# Patient Record
Sex: MALE | URBAN - METROPOLITAN AREA
[De-identification: names, ages, dates, MRNs, and addresses within clinical notes are randomized per-mention and may not be internally consistent; named-entity substitution may affect disease eponyms.]

---

## 2023-05-05 ENCOUNTER — OFFICE VISIT (OUTPATIENT)
Dept: URBAN - METROPOLITAN AREA CLINIC 30 | Facility: CLINIC | Age: 28
End: 2023-05-05
Payer: COMMERCIAL

## 2023-05-05 DIAGNOSIS — E11.9 TYPE 2 DIABETES MELLITUS WITHOUT COMPLICATIONS: ICD-10-CM

## 2023-05-05 DIAGNOSIS — D35.2 BENIGN TUMOR OF PITUITARY GLAND: Primary | ICD-10-CM

## 2023-05-05 PROCEDURE — 92004 COMPRE OPH EXAM NEW PT 1/>: CPT

## 2023-05-05 PROCEDURE — 92133 CPTRZD OPH DX IMG PST SGM ON: CPT

## 2023-05-05 ASSESSMENT — KERATOMETRY
OD: 40.99
OS: 41.64

## 2023-05-05 ASSESSMENT — VISUAL ACUITY
OS: CF 1FT
OD: 20/500

## 2023-05-05 ASSESSMENT — INTRAOCULAR PRESSURE
OS: 20
OD: 20

## 2023-05-05 NOTE — IMPRESSION/PLAN
Impression: Type 2 diabetes mellitus without complications: W40.5. A1c 9% Plan: No diabetic retinopathy is noted. Patient is advised that a yearly ophthalmic exam is recommended. Return sooner if any new symptoms.

## 2023-05-05 NOTE — IMPRESSION/PLAN
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 Has been treated with meds, tumor now reduced in size. Under care of Dr. Shell Nolasco at [facility]. HISTORY: Pt noted poor peripheral vision OS followed by OD around 2016, did not mention to parents or a PCP. In 2020 saw Dr. Mi Germain and diagnosed bitemporal HVF loss and  [place], diagnosed with pituitary tumor. Was started on bromocriptine but had panic attacks, tx with steroids, then given cabergoline. Also treated with [facility] for a few months. MRI 2023: Significant decrease in the prolactinoma. Increased downward herniation of the inferior frontal lobes, the anterior cerebral arteries, as well as likely the chiasm into the sellar region. Similar small, likely atrophic optic nerves. The optic chiasm is not well seen. Today 2023:
BCVA: OD 20/500, OS CF
+APD OS 
OCT RNFL: diffuse loss 55um OD, diffuse loss 52 um OS

PLAN:
Will follow up for 30-2 HVF testing. Gave pt information for [facility] low vision service.

## 2023-06-05 ENCOUNTER — TESTING ONLY (OUTPATIENT)
Dept: URBAN - METROPOLITAN AREA CLINIC 30 | Facility: CLINIC | Age: 28
End: 2023-06-05
Payer: COMMERCIAL

## 2023-06-05 DIAGNOSIS — D35.2 BENIGN TUMOR OF PITUITARY GLAND: Primary | ICD-10-CM

## 2023-06-05 PROCEDURE — 92250 FUNDUS PHOTOGRAPHY W/I&R: CPT

## 2023-06-05 PROCEDURE — 92083 EXTENDED VISUAL FIELD XM: CPT

## 2023-06-05 PROCEDURE — 99213 OFFICE O/P EST LOW 20 MIN: CPT

## 2023-06-05 ASSESSMENT — VISUAL ACUITY
OD: 20/300
OS: HM

## 2023-06-05 ASSESSMENT — INTRAOCULAR PRESSURE
OS: 18
OD: 18

## 2023-06-05 NOTE — IMPRESSION/PLAN
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 Has been treated with meds, tumor now reduced in size. Under care of Dr. Natividad Hugo at [facility]. HISTORY: Pt noted poor peripheral vision OS followed by OD around , did not mention to parents or a PCP. In 2020 saw Dr. Giuliana Black and diagnosed bitemporal HVF loss and   [facility], diagnosed with pituitary tumor. Was started on bromocriptine but had panic attacks, tx with steroids, then given cabergoline. Also treated with [facility] for a few months. MRI 2023: Significant decrease in the prolactinoma. Increased downward herniation of the inferior frontal lobes, the anterior cerebral arteries, as well as likely the chiasm into the sellar region. Similar small, likely atrophic optic nerves. The optic chiasm is not well seen. 2023:
BCVA: OD 20/500, OS CF
+APD OS 
OCT RNFL: diffuse loss 55um OD, diffuse loss 52 um OS

2023:
30-2 HVF: OD poor reliability, temporal hemifield loss || OS total depression Fundus photos taken today. PLAN:
Gave pt information for [employer] [facility] [employer] low vision service. Letter written for pt employer. Certificate of legal blindness filled out for patient. F/u 1 year for repeat testing OCT RNFL + 30-2 HVF.

## 2024-10-31 ENCOUNTER — OFFICE VISIT (OUTPATIENT)
Dept: URBAN - METROPOLITAN AREA CLINIC 30 | Facility: CLINIC | Age: 29
End: 2024-10-31
Payer: COMMERCIAL

## 2024-10-31 DIAGNOSIS — D35.2 BENIGN TUMOR OF PITUITARY GLAND: Primary | ICD-10-CM

## 2024-10-31 DIAGNOSIS — E11.9 TYPE 2 DIABETES MELLITUS WITHOUT COMPLICATIONS: ICD-10-CM

## 2024-10-31 PROCEDURE — 99214 OFFICE O/P EST MOD 30 MIN: CPT

## 2024-10-31 PROCEDURE — 92083 EXTENDED VISUAL FIELD XM: CPT

## 2024-10-31 PROCEDURE — 92133 CPTRZD OPH DX IMG PST SGM ON: CPT

## 2024-10-31 ASSESSMENT — KERATOMETRY
OS: 39.75
OD: 42.13

## 2024-10-31 ASSESSMENT — VISUAL ACUITY
OD: 20/400
OS: HM

## 2024-10-31 ASSESSMENT — INTRAOCULAR PRESSURE
OS: 15
OD: 18